# Patient Record
Sex: MALE | Race: WHITE
[De-identification: names, ages, dates, MRNs, and addresses within clinical notes are randomized per-mention and may not be internally consistent; named-entity substitution may affect disease eponyms.]

---

## 2019-06-17 ENCOUNTER — HOSPITAL ENCOUNTER (EMERGENCY)
Dept: HOSPITAL 4 - SED | Age: 24
Discharge: HOME | End: 2019-06-17
Payer: MEDICAID

## 2019-06-17 VITALS — SYSTOLIC BLOOD PRESSURE: 136 MMHG | HEIGHT: 72 IN | WEIGHT: 183 LBS | BODY MASS INDEX: 24.79 KG/M2

## 2019-06-17 VITALS — SYSTOLIC BLOOD PRESSURE: 120 MMHG
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
## 2019-06-17 DIAGNOSIS — S22.31XA: Primary | ICD-10-CM

## 2019-06-17 PROCEDURE — 93005 ELECTROCARDIOGRAM TRACING: CPT

## 2019-06-17 PROCEDURE — 72125 CT NECK SPINE W/O DYE: CPT

## 2019-06-17 PROCEDURE — 99284 EMERGENCY DEPT VISIT MOD MDM: CPT

## 2019-06-17 PROCEDURE — 71250 CT THORAX DX C-: CPT

## 2019-06-17 PROCEDURE — 74176 CT ABD & PELVIS W/O CONTRAST: CPT

## 2019-06-17 PROCEDURE — 96374 THER/PROPH/DIAG INJ IV PUSH: CPT
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
## 2019-06-17 NOTE — NUR
BROUGHT IN BY [facility] AND PLACED IN BED #4, TRIAGED. REPORT GIVEN TO 
VERONICA

## 2019-06-17 NOTE — NUR
Patient given written and verbal discharge instructions and verbalizes 
understanding.  ER MD Greene discussed with patient the results and treatment 
provided. Patient in stable condition. ID arm band removed. IV catheter removed 
intact and dressing applied, no active bleeding. Rx of Norco given. Patient 
educated on pain management and to follow up with PMD. Pain Scale 0. 
Opportunity for questions provided and answered. Medication side effect fact 
sheet provided.

## 2019-06-17 NOTE — NUR
# 20 gauge angiocath placed to LAC.  Use of asceptic technique.  Opsite placed 
over site.  Blood return noted.   Flushed with 10 cc of normal saline.  No 
evidence of infiltration noted.  Patient tolerated well.Pt medicated for pain.